# Patient Record
Sex: MALE | URBAN - METROPOLITAN AREA
[De-identification: names, ages, dates, MRNs, and addresses within clinical notes are randomized per-mention and may not be internally consistent; named-entity substitution may affect disease eponyms.]

---

## 2022-07-07 ENCOUNTER — HOSPITAL ENCOUNTER (EMERGENCY)
Age: 22
Discharge: HOME | End: 2022-07-07
Attending: STUDENT IN AN ORGANIZED HEALTH CARE EDUCATION/TRAINING PROGRAM
Payer: MEDICAID

## 2022-07-07 VITALS
OXYGEN SATURATION: 99 % | HEIGHT: 69 IN | WEIGHT: 215 LBS | BODY MASS INDEX: 31.84 KG/M2 | TEMPERATURE: 99.5 F | DIASTOLIC BLOOD PRESSURE: 69 MMHG | RESPIRATION RATE: 21 BRPM | HEART RATE: 96 BPM | SYSTOLIC BLOOD PRESSURE: 98 MMHG

## 2022-07-07 DIAGNOSIS — E87.6 HYPOKALEMIA: ICD-10-CM

## 2022-07-07 DIAGNOSIS — R56.9 SEIZURE-LIKE ACTIVITY (HCC): Primary | ICD-10-CM

## 2022-07-07 LAB
ALBUMIN SERPL-MCNC: 4.9 G/DL (ref 3.5–4.6)
ALP BLD-CCNC: 105 U/L (ref 35–104)
ALT SERPL-CCNC: 67 U/L (ref 0–41)
ANION GAP SERPL CALCULATED.3IONS-SCNC: 18 MEQ/L (ref 9–15)
AST SERPL-CCNC: 34 U/L (ref 0–40)
BACTERIA: NEGATIVE /HPF
BASOPHILS ABSOLUTE: 0 K/UL (ref 0–0.2)
BASOPHILS RELATIVE PERCENT: 0.1 %
BILIRUB SERPL-MCNC: <0.2 MG/DL (ref 0.2–0.7)
BILIRUBIN URINE: NEGATIVE
BLOOD, URINE: ABNORMAL
BUN BLDV-MCNC: 14 MG/DL (ref 6–20)
CALCIUM SERPL-MCNC: 9.8 MG/DL (ref 8.5–9.9)
CHLORIDE BLD-SCNC: 105 MEQ/L (ref 95–107)
CLARITY: CLEAR
CO2: 17 MEQ/L (ref 20–31)
COLOR: YELLOW
CREAT SERPL-MCNC: 0.9 MG/DL (ref 0.7–1.2)
EKG ATRIAL RATE: 121 BPM
EKG P AXIS: 47 DEGREES
EKG P-R INTERVAL: 164 MS
EKG Q-T INTERVAL: 326 MS
EKG QRS DURATION: 90 MS
EKG QTC CALCULATION (BAZETT): 462 MS
EKG R AXIS: -5 DEGREES
EKG T AXIS: 37 DEGREES
EKG VENTRICULAR RATE: 121 BPM
EOSINOPHILS ABSOLUTE: 0.2 K/UL (ref 0–0.7)
EOSINOPHILS RELATIVE PERCENT: 2.5 %
EPITHELIAL CELLS, UA: NORMAL /HPF (ref 0–5)
GFR AFRICAN AMERICAN: >60
GFR NON-AFRICAN AMERICAN: >60
GLOBULIN: 2.3 G/DL (ref 2.3–3.5)
GLUCOSE BLD-MCNC: 155 MG/DL (ref 70–99)
GLUCOSE URINE: NEGATIVE MG/DL
HCT VFR BLD CALC: 40.9 % (ref 42–52)
HEMOGLOBIN: 13.9 G/DL (ref 14–18)
HYALINE CASTS: NORMAL /HPF (ref 0–5)
KETONES, URINE: 15 MG/DL
LEUKOCYTE ESTERASE, URINE: NEGATIVE
LYMPHOCYTES ABSOLUTE: 3.1 K/UL (ref 1–4.8)
LYMPHOCYTES RELATIVE PERCENT: 30.8 %
MAGNESIUM: 2.6 MG/DL (ref 1.7–2.4)
MCH RBC QN AUTO: 28.1 PG (ref 27–31.3)
MCHC RBC AUTO-ENTMCNC: 33.9 % (ref 33–37)
MCV RBC AUTO: 83 FL (ref 80–100)
MONOCYTES ABSOLUTE: 0.9 K/UL (ref 0.2–0.8)
MONOCYTES RELATIVE PERCENT: 9 %
NEUTROPHILS ABSOLUTE: 5.8 K/UL (ref 1.4–6.5)
NEUTROPHILS RELATIVE PERCENT: 57.6 %
NITRITE, URINE: NEGATIVE
PDW BLD-RTO: 13 % (ref 11.5–14.5)
PH UA: 5.5 (ref 5–9)
PLATELET # BLD: 370 K/UL (ref 130–400)
POTASSIUM SERPL-SCNC: 2.8 MEQ/L (ref 3.4–4.9)
PROTEIN UA: ABNORMAL MG/DL
RBC # BLD: 4.93 M/UL (ref 4.7–6.1)
RBC UA: NORMAL /HPF (ref 0–5)
SODIUM BLD-SCNC: 140 MEQ/L (ref 135–144)
SPECIFIC GRAVITY UA: 1.02 (ref 1–1.03)
TOTAL PROTEIN: 7.2 G/DL (ref 6.3–8)
URINE REFLEX TO CULTURE: ABNORMAL
UROBILINOGEN, URINE: 0.2 E.U./DL
WBC # BLD: 10 K/UL (ref 4.8–10.8)
WBC UA: NORMAL /HPF (ref 0–5)

## 2022-07-07 PROCEDURE — 6360000002 HC RX W HCPCS: Performed by: STUDENT IN AN ORGANIZED HEALTH CARE EDUCATION/TRAINING PROGRAM

## 2022-07-07 PROCEDURE — 96365 THER/PROPH/DIAG IV INF INIT: CPT

## 2022-07-07 PROCEDURE — 36415 COLL VENOUS BLD VENIPUNCTURE: CPT

## 2022-07-07 PROCEDURE — 99284 EMERGENCY DEPT VISIT MOD MDM: CPT

## 2022-07-07 PROCEDURE — 2580000003 HC RX 258: Performed by: STUDENT IN AN ORGANIZED HEALTH CARE EDUCATION/TRAINING PROGRAM

## 2022-07-07 PROCEDURE — 6370000000 HC RX 637 (ALT 250 FOR IP): Performed by: STUDENT IN AN ORGANIZED HEALTH CARE EDUCATION/TRAINING PROGRAM

## 2022-07-07 PROCEDURE — 85025 COMPLETE CBC W/AUTO DIFF WBC: CPT

## 2022-07-07 PROCEDURE — 83735 ASSAY OF MAGNESIUM: CPT

## 2022-07-07 PROCEDURE — 81001 URINALYSIS AUTO W/SCOPE: CPT

## 2022-07-07 PROCEDURE — 93005 ELECTROCARDIOGRAM TRACING: CPT | Performed by: STUDENT IN AN ORGANIZED HEALTH CARE EDUCATION/TRAINING PROGRAM

## 2022-07-07 PROCEDURE — 93010 ELECTROCARDIOGRAM REPORT: CPT | Performed by: INTERNAL MEDICINE

## 2022-07-07 PROCEDURE — 80053 COMPREHEN METABOLIC PANEL: CPT

## 2022-07-07 RX ORDER — POTASSIUM CHLORIDE 7.45 MG/ML
10 INJECTION INTRAVENOUS ONCE
Status: COMPLETED | OUTPATIENT
Start: 2022-07-07 | End: 2022-07-07

## 2022-07-07 RX ORDER — 0.9 % SODIUM CHLORIDE 0.9 %
1000 INTRAVENOUS SOLUTION INTRAVENOUS ONCE
Status: COMPLETED | OUTPATIENT
Start: 2022-07-07 | End: 2022-07-07

## 2022-07-07 RX ADMIN — SODIUM CHLORIDE 1000 ML: 9 INJECTION, SOLUTION INTRAVENOUS at 02:40

## 2022-07-07 RX ADMIN — POTASSIUM BICARBONATE 40 MEQ: 782 TABLET, EFFERVESCENT ORAL at 02:43

## 2022-07-07 RX ADMIN — POTASSIUM CHLORIDE 10 MEQ: 7.46 INJECTION, SOLUTION INTRAVENOUS at 02:41

## 2022-07-07 ASSESSMENT — PAIN - FUNCTIONAL ASSESSMENT
PAIN_FUNCTIONAL_ASSESSMENT: NONE - DENIES PAIN
PAIN_FUNCTIONAL_ASSESSMENT: NONE - DENIES PAIN

## 2022-07-07 NOTE — ED PROVIDER NOTES
[address] [facility] ED  eMERGENCY dEPARTMENT eNCOUnter      Pt Name: Sonja Fisher  MRN: [id number]  [place] 2000  Date of evaluation: 7/7/2022  Provider: Vel Hoffman MD      HISTORY OF PRESENT ILLNESS      Chief Complaint   Patient presents with    Seizures     PER REPORT PT HAD SEIZURE AROUND 1130 LAST NIGHT LASTING 3 MINUTES, 0 INJURIES       The history is provided by the Patient and Father. Sonja Fisher is a 23 y.o. male with a PMH clinically significant for ASD, Obesity, Seizure d/o presenting to the ED via EMS c/o episode of seizure-like activity occurring last night and lasting approximately 3 minutes with confusion following the event. Father stating that the patient was recently admitted to [facility] for similar episode. States that they did not obtain any further EEG imaging there as the patient was having difficulty tolerating EEG electrodes and because it was the patient's first seizure. States that they discharged home with intranasal diazepam.  States that he had otherwise been feeling well prior to the episode. States that the patient did not fall during the episode. Did have generalized tonic-clonic shaking and was confused after the event. No known recent fevers, congestion, rhinorrhea, cough, decreased p.o. intake, trauma or other symptoms of recent illnesses. States that the patient is also acting at his baseline yet again. When father asked the patient, patient does not endorse any pain at this time. He is tolerating p.o. intake in the ED without difficulty. Per Chart Review: Most recent evaluation and admission at [facility] for seizure activity appreciated. Noted no significant findings on CT head. First episode of seizure at that time. Were unable to obtain EEG imaging due to patient's behaviors. Opted for further evaluation and management as an outpatient. Was discharged with Valium nasal spray.     REVIEW OF SYSTEMS       Review of Systems   Unable to perform ROS: Patient nonverbal       PAST MEDICAL HISTORY     Past Medical History:   Diagnosis Date    Autism     Seizure [facility])        SURGICAL HISTORY       Past Surgical History:   Procedure Laterality Date    HERNIA REPAIR         FAMILY HISTORY     History reviewed. No pertinent family history. SOCIAL HISTORY       Social History     Socioeconomic History    Marital status: Unknown     Spouse name: None    Number of children: None    Years of education: None    Highest education level: None   Occupational History    None   Tobacco Use    Smoking status: Never Smoker    Smokeless tobacco: Never Used   Substance and Sexual Activity    Alcohol use: Never    Drug use: Never    Sexual activity: None   Other Topics Concern    None   Social History Narrative    None     Social Determinants of Health     Financial Resource Strain:     Difficulty of Paying Living Expenses: Not on file   Food Insecurity:     Worried About Running Out of Food in the Last Year: Not on file    Ginger of Food in the Last Year: Not on file   Transportation Needs:     Lack of Transportation (Medical): Not on file    Lack of Transportation (Non-Medical):  Not on file   Physical Activity:     Days of Exercise per Week: Not on file    Minutes of Exercise per Session: Not on file   Stress:     Feeling of Stress : Not on file   Social Connections:     Frequency of Communication with Friends and Family: Not on file    Frequency of Social Gatherings with Friends and Family: Not on file    Attends Latter-day Services: Not on file    Active Member of Clubs or Organizations: Not on file    Attends Club or Organization Meetings: Not on file    Marital Status: Not on file   Intimate Partner Violence:     Fear of Current or Ex-Partner: Not on file    Emotionally Abused: Not on file    Physically Abused: Not on file    Sexually Abused: Not on file   Housing Stability:     Unable to Pay for Housing in the Last Year: Not on file  Number of Places Lived in the Last Year: Not on file    Unstable Housing in the Last Year: Not on file       CURRENT MEDICATIONS       There are no discharge medications for this patient. ALLERGIES     Patient has no known allergies. PHYSICAL EXAM       ED Triage Vitals   BP Temp Temp src Pulse Resp SpO2 Height Weight   -- -- -- -- -- -- -- --       Physical Exam  Vitals and nursing note reviewed. Constitutional:       General: He is not in acute distress. Appearance: He is obese. He is not ill-appearing, toxic-appearing or diaphoretic. HENT:      Head: Normocephalic and atraumatic. Mouth/Throat:      Mouth: Mucous membranes are moist.      Pharynx: Oropharynx is clear. Comments: Dried blood possibly noted in the oropharynx. No oral lacerations or lesions however. Eyes:      Extraocular Movements: Extraocular movements intact. Pupils: Pupils are equal, round, and reactive to light. Cardiovascular:      Rate and Rhythm: Regular rhythm. Tachycardia present. Pulses: Normal pulses. Heart sounds: Normal heart sounds. Pulmonary:      Effort: Pulmonary effort is normal.      Breath sounds: Normal breath sounds. Abdominal:      General: There is no distension. Palpations: Abdomen is soft. Tenderness: There is no abdominal tenderness. Musculoskeletal:      Cervical back: Normal range of motion and neck supple. Right lower leg: No edema. Left lower leg: No edema. Skin:     General: Skin is warm and dry. Capillary Refill: Capillary refill takes less than 2 seconds. Neurological:      Mental Status: He is alert and oriented to person, place, and time. Mental status is at baseline. Sensory: Sensation is intact. Motor: Motor function is intact.    Psychiatric:         Mood and Affect: Mood normal.         Behavior: Behavior normal.         MDM:   Chart Reviewed: PMH and additional information as noted in HPI obtained from chart review    Vitals:    Vitals:    07/07/22 0130 07/07/22 0200 07/07/22 0244 07/07/22 0300   BP: 128/68 (!) 126/108 (!) 140/76 98/69   Pulse: (!) 117 98 (!) 107 96   Resp: 19 19 24 21   Temp:       TempSrc:       SpO2: 100% 99% 100% 99%   Weight:       Height:           PROCEDURES:  Unless otherwise noted below, none  Procedures    LABS:  Labs Reviewed   COMPREHENSIVE METABOLIC PANEL - Abnormal; Notable for the following components:       Result Value    Potassium 2.8 (*)     CO2 17 (*)     Anion Gap 18 (*)     Glucose 155 (*)     Albumin 4.9 (*)     Alkaline Phosphatase 105 (*)     ALT 67 (*)     All other components within normal limits    Narrative:     CALL  [facility]  [facility] tel. [phone],  POTASSIUM results called to and read back by DR MONET, 07/07/2022 02:10,  by [facility]   MAGNESIUM - Abnormal; Notable for the following components:    Magnesium 2.6 (*)     All other components within normal limits    Narrative:     CALL  [facility]  [facility] tel. [phone],  POTASSIUM results called to and read back by DR MONET, 07/07/2022 02:10,  by [facility]   CBC WITH AUTO DIFFERENTIAL - Abnormal; Notable for the following components:    Hemoglobin 13.9 (*)     Hematocrit 40.9 (*)     Monocytes Absolute 0.9 (*)     All other components within normal limits   URINALYSIS WITH REFLEX TO CULTURE - Abnormal; Notable for the following components:    Ketones, Urine 15 (*)     Blood, Urine TRACE (*)     Protein, UA TRACE (*)     All other components within normal limits   MICROSCOPIC URINALYSIS       No orders to display       ED Course as of 07/13/22 0907   Thu Jul 07, 2022   0417 CBC with Auto Differential(!):    WBC 10.0   RBC 4.93   Hemoglobin Quant 13.9(!)   Hematocrit 40. 9(!)   MCV 83.0   MCH 28.1   MCHC 33.9   RDW 13.0   Platelet Count 344   Neutrophils % 57.6   Lymphocyte % 30.8   Monocytes % 9.0   Eosinophils % 2.5   Basophils % 0.1   Neutrophils Absolute 5.8   Lymphocytes Absolute 3.1   Monocytes Absolute 0.9(!)   Eosinophils Absolute 0.2   Basophils Absolute 0.0  Largely unremarkable [NA]   0417 Urinalysis with Reflex to Culture(!):    Color, UA Yellow   Clarity, UA Clear   Glucose, UA Negative   Bilirubin, Urine Negative   Ketones, Urine 15(!)   Specific Gravity, UA 1.022   Blood, Urine TRACE(!)   pH, UA 5.5   Protein, UA TRACE(!)   Urobilinogen, Urine 0.2   Nitrite, Urine Negative   Leukocyte Esterase, Urine Negative   Urine Reflex to Culture Not Indicated  No evidence of UTI [NA]   0418 ALT(!): 67  Minimally elevated [NA]   0418 Potassium(!!): 2.8  Mild hypokalemia. Will replace in the ED. [NA]   0418 Anion Gap(!): 18  Likely secondary to lactic acidosis following seizure. [NA]   [id number] Magnesium(!): 2.6 [NA]   0418 EKG 12 Lead  EKG showing sinus tachycardia, rate of 121 bpm.  LVH. Normal axis, normal intervals. Nonspecific ST-T wave abnormalities. [NA]      ED Course User Index  [NA] Mercer Lanes, MD       23 y.o. male with a PMH clinically significant for ASD, Obesity, Seizure d/o presenting to the ED via EMS c/o episode of seizure-like activity occurring last night and lasting approximately 3 minutes with confusion following the event. Upon initial evaluation, Pt tachycardic and mildly agitated/confused, but otherwise Afebrile, HDS and in NAD. PE as noted above. Labs,  and Imaging as noted above. Given findings, clinical presentation most likely consistent w/ seizure in the setting of likely yet undiagnosed seizure disorder with single previous seizure already noted. No evidence of status epilepticus however. Did not require any abortive measures. Patient currently living in [place] and therefore does not have a neurologist in [facility] [place] or [facility]. Father requesting possible referral versus following up in neurology. Would not like the patient to be admitted at this time. Will defer antiepileptics to neurology. Patient already with intranasal diazepam as needed. No evidence of acute infectious processes.   Low suspicion for intracranial processes causing the patient's symptoms. Patient appearing to be back to baseline in the ED. Stable for further evaluation management as an outpatient. Pt was administered   Medications   0.9 % sodium chloride bolus (0 mLs IntraVENous Stopped 7/7/22 0346)   potassium chloride 10 mEq/100 mL IVPB (Peripheral Line) (0 mEq IntraVENous Stopped 7/7/22 0346)   potassium bicarb-citric acid (EFFER-K) effervescent tablet 40 mEq (40 mEq Oral Given 7/7/22 0243)       Plan: Discharge home in good condition with meds as noted below and instructions to follow up with PCP and NEurology. Pt stable and appropriate for further evaluation and management as an outpatient. and Patient understanding and amenable to the POC. CRITICAL CARE TIME   Total CriticalCare time was 0 minutes, excluding separately reportable procedures. There was a high probability of clinically significant/life threatening deterioration in the patient's condition which required my urgent intervention. FINAL IMPRESSION      1. Seizure-like activity ([address]    2. Hypokalemia          DISPOSITION/PLAN   DISPOSITION Decision To Discharge 07/07/2022 03:45:18 AM      There are no discharge medications for this patient.        MD Brian Garcia MD  07/13/22 1056

## 2022-07-07 NOTE — ED NOTES
OBT. BLOOD AND URINE TO LAB, EKG COMPLETED AND TO DR. Erin aLguerre.      Roxianne Kocher, RN  07/07/22 1341

## 2022-07-14 PROBLEM — E66.811 OBESITY (BMI 30.0-34.9): Status: ACTIVE | Noted: 2020-01-30

## 2022-07-14 PROBLEM — R56.9 SEIZURE (HCC): Status: ACTIVE | Noted: 2022-04-06

## 2022-07-14 PROBLEM — R46.89 BEHAVIOR CONCERN: Status: ACTIVE | Noted: 2020-01-30

## 2022-07-14 PROBLEM — E66.9 OBESITY (BMI 30.0-34.9): Status: ACTIVE | Noted: 2020-01-30

## 2022-07-14 PROBLEM — L84 FOOT CALLUS: Status: ACTIVE | Noted: 2020-08-19

## 2022-07-14 PROBLEM — L70.9 ACNE: Status: ACTIVE | Noted: 2019-10-09

## 2022-07-14 PROBLEM — Z71.85 VACCINE COUNSELING: Status: ACTIVE | Noted: 2019-10-09

## 2022-07-14 PROBLEM — R26.89 TOE WALKER: Status: ACTIVE | Noted: 2020-08-19

## 2022-07-19 ENCOUNTER — OFFICE VISIT (OUTPATIENT)
Dept: NEUROLOGY | Age: 22
End: 2022-07-19
Payer: MEDICAID

## 2022-07-19 VITALS
WEIGHT: 227 LBS | SYSTOLIC BLOOD PRESSURE: 110 MMHG | HEIGHT: 69 IN | HEART RATE: 89 BPM | BODY MASS INDEX: 33.62 KG/M2 | DIASTOLIC BLOOD PRESSURE: 82 MMHG

## 2022-07-19 DIAGNOSIS — R56.9 SEIZURE (HCC): ICD-10-CM

## 2022-07-19 DIAGNOSIS — R56.9 SEIZURES (HCC): Primary | ICD-10-CM

## 2022-07-19 DIAGNOSIS — F84.0 ACTIVE AUTISTIC DISORDER: ICD-10-CM

## 2022-07-19 DIAGNOSIS — R46.89 BEHAVIOR CONCERN: ICD-10-CM

## 2022-07-19 PROCEDURE — 99205 OFFICE O/P NEW HI 60 MIN: CPT | Performed by: PSYCHIATRY & NEUROLOGY

## 2022-07-19 RX ORDER — DIPHENHYDRAMINE HCL 12.5MG/5ML
25 LIQUID (ML) ORAL EVERY 6 HOURS PRN
COMMUNITY
Start: 2021-09-08

## 2022-07-19 RX ORDER — UREA 40 %
CREAM (GRAM) TOPICAL
COMMUNITY
Start: 2021-10-25

## 2022-07-19 RX ORDER — DIVALPROEX SODIUM 125 MG/1
500 CAPSULE, COATED PELLETS ORAL 2 TIMES DAILY
Qty: 60 CAPSULE | Refills: 3 | Status: SHIPPED | OUTPATIENT
Start: 2022-07-19

## 2022-07-19 RX ORDER — FLUOXETINE HYDROCHLORIDE 20 MG/5ML
LIQUID ORAL
COMMUNITY
Start: 2021-10-04

## 2022-07-19 NOTE — PROGRESS NOTES
Subjective:      Patient ID: Lieutenant Fleming is a 23 y.o. male who presents today for:  Chief Complaint   Patient presents with    New Patient     Seizure , patient had a seizure on July 6th lasted about two minutes dad states. HPI 23 right-handed gentleman who is referred here for seizures. Patient is here with parents as he is autistic birth. Patient is here as he had a seizure in April which was described as a generalized seizure. It was a witnessed seizure. The second seizure in July which was witnessed by the father and patient was standing and became stiff his eyes rolled up and he passed out and shook for about 3 minutes and was postictal for quite some time. Was seen in the emergency room. He was then seen by a neurologist at [facility] in [place] and had not recommended anticonvulsants given that the recurrence rate of seizures is low. Patient has another brother who is also autistic is his younger brother. They usually spend time together in a home in [place] and have 24-hour care. The parents go back and forth [place] in [place]. Patient requires help with some of his and most of his functions. Patient has history of some anger behaviors or agitation but nothing violent. He is mostly apprehensive when he wants things done. He eats well. He has also has some sleep deprivation of recent and then sleeps longer hours.     Past Medical History:   Diagnosis Date    Autism     Seizure [facility])      Past Surgical History:   Procedure Laterality Date    HERNIA REPAIR       Social History     Socioeconomic History    Marital status: Unknown     Spouse name: Not on file    Number of children: Not on file    Years of education: Not on file    Highest education level: Not on file   Occupational History    Not on file   Tobacco Use    Smoking status: Never    Smokeless tobacco: Never   Substance and Sexual Activity    Alcohol use: Never    Drug use: Never    Sexual activity: Not on file   Other Topics Concern    Not on file   Social History Narrative    Not on file     Social Determinants of Health     Financial Resource Strain: Not on file   Food Insecurity: Not on file   Transportation Needs: Not on file   Physical Activity: Not on file   Stress: Not on file   Social Connections: Not on file   Intimate Partner Violence: Not on file   Housing Stability: Not on file     No family history on file. Allergies   Allergen Reactions    Cat Hair Extract        Current Outpatient Medications   Medication Sig Dispense Refill    diazePAM, 20 MG Dose, 2 x 10 MG/0.1ML LQPK 1 spray by Nasal route once as needed      diphenhydrAMINE (BENADRYL) 12.5 MG/5ML elixir Take 25 mg by mouth every 6 hours as needed      FLUoxetine (PROZAC) 20 MG/5ML solution TAKE 10 ML BY MOUTH DAILY      Urea (CARMOL) 40 % cream APPLY TO CALLUSES ON FEET TWICE DAILY      divalproex (DEPAKOTE SPRINKLES) 125 MG capsule Take 4 capsules by mouth in the morning and 4 capsules before bedtime. 60 capsule 3     No current facility-administered medications for this visit. Review of Systems   Unable to perform ROS: Mental status change     Objective:   /82 (Site: Left Upper Arm, Position: Sitting, Cuff Size: Medium Adult)   Pulse 89   Ht 5' 9\" (1.753 m)   Wt 227 lb (103 kg)   BMI 33.52 kg/m²     Physical Exam patient reveals a patient is wide-awake and intermittently shakes my hand but does follow commands. Pupils are equal reactive arms are conjugate he is speech is understood by his father very well but I had some difficulty. There is no nystagmus. He moves all his extremities to good strength and cardiovascular system S1 and S2 are normal no murmurs appreciated no carotid bruits and he has a normal gait. No results found.     Lab Results   Component Value Date/Time    WBC 10.0 07/07/2022 01:15 AM    RBC 4.93 07/07/2022 01:15 AM    HGB 13.9 07/07/2022 01:15 AM    HCT 40.9 07/07/2022 01:15 AM    MCV 83.0 07/07/2022 01:15 AM    MCH 28.1 07/07/2022 01:15 AM    MCHC 33.9 07/07/2022 01:15 AM    RDW 13.0 07/07/2022 01:15 AM     07/07/2022 01:15 AM     Lab Results   Component Value Date/Time     07/07/2022 01:15 AM    K 2.8 07/07/2022 01:15 AM     07/07/2022 01:15 AM    CO2 17 07/07/2022 01:15 AM    BUN 14 07/07/2022 01:15 AM    CREATININE 0.90 07/07/2022 01:15 AM    GFRAA >60.0 07/07/2022 01:15 AM    LABGLOM >60.0 07/07/2022 01:15 AM    GLUCOSE 155 07/07/2022 01:15 AM    PROT 7.2 07/07/2022 01:15 AM    LABALBU 4.9 07/07/2022 01:15 AM    CALCIUM 9.8 07/07/2022 01:15 AM    BILITOT <0.2 07/07/2022 01:15 AM    ALKPHOS 105 07/07/2022 01:15 AM    AST 34 07/07/2022 01:15 AM    ALT 67 07/07/2022 01:15 AM     No results found for: PROTIME, INR  No results found for: TSH, UIOWCCVY28, FOLATE, FERRITIN, IRON, TIBC, PTRFSAT, RETICCOUNT, TSH, FREET4  No results found for: TRIG, HDL, LDLCALC, LDLDIRECT, LABVLDL  No results found for: LABAMPH, BARBSCNU, LABBENZ, CANNAB, COCAINESCRN, LABMETH, OPIATESCREENURINE, PHENCYCLIDINESCREENURINE, PPXUR, ETOH  No results found for: LITHIUM, DILFRTOT, VALPROATE    Assessment:       Diagnosis Orders   1. Seizures (HCC)  CBC with Auto Differential    Comprehensive Metabolic Panel    Valproic Acid Level, Total      2. Active autistic disorder        3. Seizure ([address]        4. Behavior concern          Generalized Seizures. Patient already had 2 seizures well described as generalized seizures with a postictal state. The description appears to be that of a clinical generalized seizure. This did not appear to suggest a nonepileptic seizure. The parents are well educated in terms of autism and seizures and nonepileptic seizures. I truly feel that there is a higher incidence of seizures in autism and there is a well described in the literature and he has higher chance of having another seizure anytime in his life and at some point it could be detrimental to her injuries.   Given that he had 2 seizures with best we treated now and we discussed at length multiple options. Keppra would be the best though he has some suggestion of behavioral issues and concern and therefore may not be the best option. Depakote will be the best option though we did discuss all the side effects and occasional weight gain. We need to monitor his liver at 6 weeks. The other option would be Trileptal and then we would have to monitor his sodium. We will start off with Depakote sprinkles which will be easier to use at 500 mg twice a day and blood levels at 6 weeks to make sure is not developed any liver dysfunction. This may help with his areas. We will watch his weight for now. EEG is difficult to perform in this patient's and the yield of this interictal EEG is are poor since we are elected to treat his seizures. Not recommended this at this time though if you have breakthrough seizures on anticonvulsants then we may consider obtaining 1. I do lengthy discussion with the parents regarding this and they are in agreement at this time  We will follow him up in 3 months  Lengthy discussions have occurred in the time is around 60 minutes    Colt Mcmullen MD, [place] American Board of Psychiatry & Neurology  Board Certified in Vascular Neurology  Board Certified in Neuromuscular Medicine  Certified in [facility]:      Orders Placed This Encounter   Procedures    CBC with Auto Differential     6 weeks     Standing Status:   Future     Standing Expiration Date:   7/19/2023    Comprehensive Metabolic Panel     6 weeks     Standing Status:   Future     Standing Expiration Date:   7/19/2023    Valproic Acid Level, Total     6 weeks     Standing Status:   Future     Standing Expiration Date:   7/19/2023     Order Specific Question:   Dose Schedule & Time of Last Dose?      Answer:   none     Orders Placed This Encounter   Medications    divalproex (DEPAKOTE SPRINKLES) 125 MG capsule     Sig: Take 4 capsules by mouth in the morning and 4 capsules before bedtime. Dispense:  60 capsule     Refill:  3       Return in about 3 months (around 10/19/2022).       Kanchan Grewal MD

## 2023-06-20 ENCOUNTER — OFFICE VISIT (OUTPATIENT)
Dept: FAMILY MEDICINE CLINIC | Age: 23
End: 2023-06-20
Payer: MEDICAID

## 2023-06-20 VITALS
HEIGHT: 69 IN | TEMPERATURE: 98.4 F | HEART RATE: 56 BPM | SYSTOLIC BLOOD PRESSURE: 136 MMHG | OXYGEN SATURATION: 98 % | BODY MASS INDEX: 33.03 KG/M2 | WEIGHT: 223 LBS | DIASTOLIC BLOOD PRESSURE: 84 MMHG

## 2023-06-20 DIAGNOSIS — R56.9 SEIZURE (HCC): ICD-10-CM

## 2023-06-20 DIAGNOSIS — E66.9 OBESITY (BMI 30.0-34.9): ICD-10-CM

## 2023-06-20 DIAGNOSIS — Z01.818 PRE-OP EXAM: ICD-10-CM

## 2023-06-20 DIAGNOSIS — F84.0 AUTISM: Primary | ICD-10-CM

## 2023-06-20 PROCEDURE — 99203 OFFICE O/P NEW LOW 30 MIN: CPT | Performed by: FAMILY MEDICINE

## 2023-06-20 SDOH — ECONOMIC STABILITY: FOOD INSECURITY: WITHIN THE PAST 12 MONTHS, YOU WORRIED THAT YOUR FOOD WOULD RUN OUT BEFORE YOU GOT MONEY TO BUY MORE.: NEVER TRUE

## 2023-06-20 SDOH — ECONOMIC STABILITY: INCOME INSECURITY: HOW HARD IS IT FOR YOU TO PAY FOR THE VERY BASICS LIKE FOOD, HOUSING, MEDICAL CARE, AND HEATING?: NOT HARD AT ALL

## 2023-06-20 SDOH — ECONOMIC STABILITY: HOUSING INSECURITY
IN THE LAST 12 MONTHS, WAS THERE A TIME WHEN YOU DID NOT HAVE A STEADY PLACE TO SLEEP OR SLEPT IN A SHELTER (INCLUDING NOW)?: NO

## 2023-06-20 SDOH — ECONOMIC STABILITY: FOOD INSECURITY: WITHIN THE PAST 12 MONTHS, THE FOOD YOU BOUGHT JUST DIDN'T LAST AND YOU DIDN'T HAVE MONEY TO GET MORE.: NEVER TRUE

## 2023-06-20 ASSESSMENT — PATIENT HEALTH QUESTIONNAIRE - PHQ9
2. FEELING DOWN, DEPRESSED OR HOPELESS: 0
SUM OF ALL RESPONSES TO PHQ9 QUESTIONS 1 & 2: 0
SUM OF ALL RESPONSES TO PHQ QUESTIONS 1-9: 0
SUM OF ALL RESPONSES TO PHQ QUESTIONS 1-9: 0
1. LITTLE INTEREST OR PLEASURE IN DOING THINGS: 0
SUM OF ALL RESPONSES TO PHQ QUESTIONS 1-9: 0
SUM OF ALL RESPONSES TO PHQ QUESTIONS 1-9: 0

## 2023-06-20 NOTE — PROGRESS NOTES
Chief Complaint   Patient presents with    New Patient     Need for oral surgeries, needs to go under for extraction        HPI:  Moustapha Rodríguez is a 20 y.o. male     Tried to have tooth extraction using valium to calm, but couldn't do it. Needs under anesthesia, so needs medical clearance    New to me     No acute complaints    History of stress-induced seizure like episode    No chronic anti-seizure meds. Patient Active Problem List   Diagnosis    Acne    Autism    Behavior concern    Foot callus    Obesity (BMI 30.0-34. 9)    Seizure (HCC)    Toe walker    Vaccine counseling       Current Outpatient Medications   Medication Sig Dispense Refill    diphenhydrAMINE (BENADRYL) 12.5 MG/5ML elixir Take 10 mLs by mouth every 6 hours as needed      Urea (CARMOL) 40 % cream APPLY TO CALLUSES ON FEET TWICE DAILY       No current facility-administered medications for this visit. Past Medical History:   Diagnosis Date    Autism     Seizure ([address]     stress/sleep deprivation induced, non-epileptic     Past Surgical History:   Procedure Laterality Date    HERNIA REPAIR       History reviewed. No pertinent family history.   Social History     Socioeconomic History    Marital status: Unknown     Spouse name: None    Number of children: None    Years of education: None    Highest education level: None   Tobacco Use    Smoking status: Never    Smokeless tobacco: Never   Substance and Sexual Activity    Alcohol use: Never    Drug use: Never     Social Determinants of Health     Financial Resource Strain: Low Risk     Difficulty of Paying Living Expenses: Not hard at all   Food Insecurity: No Food Insecurity    Worried About Running Out of Food in the Last Year: Never true    Ran Out of Food in the Last Year: Never true   Transportation Needs: Unknown    Lack of Transportation (Non-Medical): No   Housing Stability: Unknown    Unstable Housing in the Last Year: No     Allergies   Allergen Reactions    Cat Hair Extract

## 2024-06-21 ENCOUNTER — OFFICE VISIT (OUTPATIENT)
Dept: FAMILY MEDICINE CLINIC | Age: 24
End: 2024-06-21
Payer: MEDICAID

## 2024-06-21 VITALS
HEART RATE: 98 BPM | BODY MASS INDEX: 30.36 KG/M2 | OXYGEN SATURATION: 99 % | WEIGHT: 205 LBS | HEIGHT: 69 IN | DIASTOLIC BLOOD PRESSURE: 72 MMHG | SYSTOLIC BLOOD PRESSURE: 110 MMHG

## 2024-06-21 DIAGNOSIS — F84.0 AUTISM: Primary | ICD-10-CM

## 2024-06-21 DIAGNOSIS — R56.9 SEIZURE (HCC): ICD-10-CM

## 2024-06-21 PROCEDURE — 99213 OFFICE O/P EST LOW 20 MIN: CPT | Performed by: FAMILY MEDICINE

## 2024-06-21 RX ORDER — CEPHALEXIN 250 MG/5ML
250 POWDER, FOR SUSPENSION ORAL 3 TIMES DAILY
COMMUNITY
Start: 2024-06-19

## 2024-06-21 RX ORDER — CLINDAMYCIN PHOSPHATE 10 UG/ML
LOTION TOPICAL
COMMUNITY
Start: 2024-04-20

## 2024-06-21 RX ORDER — CHLORHEXIDINE GLUCONATE ORAL RINSE 1.2 MG/ML
15 SOLUTION DENTAL 2 TIMES DAILY
COMMUNITY
Start: 2023-11-06

## 2024-06-21 SDOH — ECONOMIC STABILITY: FOOD INSECURITY: WITHIN THE PAST 12 MONTHS, THE FOOD YOU BOUGHT JUST DIDN'T LAST AND YOU DIDN'T HAVE MONEY TO GET MORE.: NEVER TRUE

## 2024-06-21 SDOH — ECONOMIC STABILITY: FOOD INSECURITY: WITHIN THE PAST 12 MONTHS, YOU WORRIED THAT YOUR FOOD WOULD RUN OUT BEFORE YOU GOT MONEY TO BUY MORE.: NEVER TRUE

## 2024-06-21 SDOH — ECONOMIC STABILITY: INCOME INSECURITY: HOW HARD IS IT FOR YOU TO PAY FOR THE VERY BASICS LIKE FOOD, HOUSING, MEDICAL CARE, AND HEATING?: NOT HARD AT ALL

## 2024-06-21 ASSESSMENT — PATIENT HEALTH QUESTIONNAIRE - PHQ9
SUM OF ALL RESPONSES TO PHQ QUESTIONS 1-9: 0
2. FEELING DOWN, DEPRESSED OR HOPELESS: NOT AT ALL
SUM OF ALL RESPONSES TO PHQ9 QUESTIONS 1 & 2: 0
SUM OF ALL RESPONSES TO PHQ QUESTIONS 1-9: 0
1. LITTLE INTEREST OR PLEASURE IN DOING THINGS: NOT AT ALL
SUM OF ALL RESPONSES TO PHQ QUESTIONS 1-9: 0
SUM OF ALL RESPONSES TO PHQ QUESTIONS 1-9: 0

## 2024-06-21 NOTE — PROGRESS NOTES
Neurovascularly intact w/ Sensory/Motor intact UE/LE Bilaterally.    Lab Results   Component Value Date    WBC 10.0 07/07/2022    HGB 13.9 (L) 07/07/2022    HCT 40.9 (L) 07/07/2022     07/07/2022    ALT 67 (H) 07/07/2022    AST 34 07/07/2022     07/07/2022    K 2.8 (LL) 07/07/2022     07/07/2022    CREATININE 0.90 07/07/2022    BUN 14 07/07/2022    CO2 17 (L) 07/07/2022         A&P   Diagnosis Orders   1. Autism        2. Seizure (HCC)            Suggest probiotic    Defer blood tests at this time  Needle phobic         Wilver Vyas MD

## 2025-06-08 SDOH — ECONOMIC STABILITY: FOOD INSECURITY: WITHIN THE PAST 12 MONTHS, YOU WORRIED THAT YOUR FOOD WOULD RUN OUT BEFORE YOU GOT MONEY TO BUY MORE.: NEVER TRUE

## 2025-06-08 SDOH — ECONOMIC STABILITY: INCOME INSECURITY: IN THE LAST 12 MONTHS, WAS THERE A TIME WHEN YOU WERE NOT ABLE TO PAY THE MORTGAGE OR RENT ON TIME?: NO

## 2025-06-08 SDOH — ECONOMIC STABILITY: FOOD INSECURITY: WITHIN THE PAST 12 MONTHS, THE FOOD YOU BOUGHT JUST DIDN'T LAST AND YOU DIDN'T HAVE MONEY TO GET MORE.: NEVER TRUE

## 2025-06-08 SDOH — ECONOMIC STABILITY: TRANSPORTATION INSECURITY
IN THE PAST 12 MONTHS, HAS LACK OF TRANSPORTATION KEPT YOU FROM MEETINGS, WORK, OR FROM GETTING THINGS NEEDED FOR DAILY LIVING?: NO

## 2025-06-08 SDOH — ECONOMIC STABILITY: TRANSPORTATION INSECURITY
IN THE PAST 12 MONTHS, HAS THE LACK OF TRANSPORTATION KEPT YOU FROM MEDICAL APPOINTMENTS OR FROM GETTING MEDICATIONS?: NO

## 2025-06-09 ENCOUNTER — OFFICE VISIT (OUTPATIENT)
Dept: FAMILY MEDICINE CLINIC | Age: 25
End: 2025-06-09
Payer: MEDICAID

## 2025-06-09 VITALS
HEART RATE: 100 BPM | DIASTOLIC BLOOD PRESSURE: 86 MMHG | HEIGHT: 68 IN | WEIGHT: 240 LBS | SYSTOLIC BLOOD PRESSURE: 128 MMHG | TEMPERATURE: 97.7 F | OXYGEN SATURATION: 98 % | BODY MASS INDEX: 36.37 KG/M2

## 2025-06-09 DIAGNOSIS — F84.0 AUTISM: ICD-10-CM

## 2025-06-09 DIAGNOSIS — Z00.00 PREVENTATIVE HEALTH CARE: Primary | ICD-10-CM

## 2025-06-09 DIAGNOSIS — R56.9 SEIZURE (HCC): ICD-10-CM

## 2025-06-09 DIAGNOSIS — H61.23 BILATERAL IMPACTED CERUMEN: ICD-10-CM

## 2025-06-09 DIAGNOSIS — Z01.818 PRE-OP EXAM: ICD-10-CM

## 2025-06-09 PROCEDURE — 99395 PREV VISIT EST AGE 18-39: CPT | Performed by: FAMILY MEDICINE

## 2025-06-09 ASSESSMENT — PATIENT HEALTH QUESTIONNAIRE - PHQ9
SUM OF ALL RESPONSES TO PHQ QUESTIONS 1-9: 0
2. FEELING DOWN, DEPRESSED OR HOPELESS: NOT AT ALL
SUM OF ALL RESPONSES TO PHQ QUESTIONS 1-9: 0
1. LITTLE INTEREST OR PLEASURE IN DOING THINGS: NOT AT ALL
SUM OF ALL RESPONSES TO PHQ QUESTIONS 1-9: 0
SUM OF ALL RESPONSES TO PHQ QUESTIONS 1-9: 0

## 2025-06-09 NOTE — PROGRESS NOTES
13.9 (L) 07/07/2022    HCT 40.9 (L) 07/07/2022     07/07/2022    ALT 67 (H) 07/07/2022    AST 34 07/07/2022     07/07/2022    K 2.8 (LL) 07/07/2022     07/07/2022    CREATININE 0.90 07/07/2022    BUN 14 07/07/2022    CO2 17 (L) 07/07/2022         A&P   Diagnosis Orders   1. Preventative health care        2. Seizure (HCC)        3. Autism        4. Pre-op exam        5. Bilateral impacted cerumen  carbamide peroxide (DEBROX) 6.5 % otic solution        Clear for dental surgery under anesthesia    F/u prn     Drops for ears     Wilver Vyas MD